# Patient Record
Sex: FEMALE | Race: WHITE
[De-identification: names, ages, dates, MRNs, and addresses within clinical notes are randomized per-mention and may not be internally consistent; named-entity substitution may affect disease eponyms.]

---

## 2018-05-29 NOTE — XR
Abdomen

 

HISTORY: Pain

 

Frontal view of the abdomen submitted on 2 images

 

There is a levoscoliosis centered at L3. Lung bases are clear. No pneumoperitoneum or bowel obstructi
on.

 

IMPRESSION: Nonobstructive bowel gas pattern

## 2018-05-29 NOTE — ED
Abdominal Pain HPI





- General


Chief Complaint: Abdominal Pain


Stated Complaint: abd pain


Time Seen by Provider: 05/29/18 19:36


Source: patient, RN notes reviewed, old records reviewed


Mode of arrival: ambulatory


Limitations: no limitations





- History of Present Illness


Initial Comments: 





This patient's history and 38-year-old female presents the emergency Department 

chief complaint of lower abdominal pain which is dull in nature for the past 2 

days.  She states that it occurred originally she was on a boat yesterday and 

seemed to become acutely worse.  Patient states that she minor tenderness in 

the right lower abdomen.  She reports the pain seems to come and go.  Hours 

with food.  She did have normal bowel movements and urination.  Denies any 

fever or chills.  No history of ovarian cysts.





- Related Data


 Home Medications











 Medication  Instructions  Recorded  Confirmed


 


Levothyroxine Sodium [Synthroid] 50 mcg PO DAILY 05/29/18 05/29/18











 Allergies











Allergy/AdvReac Type Severity Reaction Status Date / Time


 


No Known Allergies Allergy   Verified 05/29/18 20:37














Review of Systems


ROS Statement: 


Those systems with pertinent positive or pertinent negative responses have been 

documented in the HPI.





ROS Other: All systems not noted in ROS Statement are negative.





Past Medical History


Past Medical History: Thyroid Disorder


History of Any Multi-Drug Resistant Organisms: None Reported


Past Surgical History: Tonsillectomy


Past Psychological History: No Psychological Hx Reported


Smoking Status: Never smoker


Past Alcohol Use History: Occasional


Past Drug Use History: None Reported





General Exam





- General Exam Comments


Initial Comments: 





38-year-old female.  Alert and oriented.  No acute distress.


Limitations: no limitations


General appearance: alert, in no apparent distress


Head exam: Present: atraumatic, normocephalic, normal inspection


Eye exam: Present: normal appearance, PERRL, EOMI.  Absent: scleral icterus, 

conjunctival injection, periorbital swelling


ENT exam: Present: normal exam, mucous membranes moist


Neck exam: Present: normal inspection.  Absent: tenderness, meningismus, 

lymphadenopathy


Respiratory exam: Present: normal lung sounds bilaterally.  Absent: respiratory 

distress, wheezes, rales, rhonchi, stridor


Cardiovascular Exam: Present: regular rate, normal rhythm, normal heart sounds.

  Absent: systolic murmur, diastolic murmur, rubs, gallop, clicks


GI/Abdominal exam: Present: soft, tenderness (Patient has some periumbilical 

right lower quadrant tenderness.), normal bowel sounds.  Absent: distended, 

guarding, rebound, rigid


Extremities exam: Present: normal inspection, full ROM, normal capillary 

refill.  Absent: tenderness, pedal edema, joint swelling, calf tenderness


Back exam: Present: normal inspection


Neurological exam: Present: alert, oriented X3, CN II-XII intact


Psychiatric exam: Present: normal affect, normal mood


Skin exam: Present: warm, dry, intact, normal color.  Absent: rash





Course


 Vital Signs











  05/29/18 05/29/18





  18:47 21:32


 


Temperature 98.2 F 98.3 F


 


Pulse Rate 60 54 L


 


Respiratory 20 16





Rate  


 


Blood Pressure 126/71 108/59


 


O2 Sat by Pulse 100 100





Oximetry  














Medical Decision Making





- Medical Decision Making





38-year-old female presents to the emergency department stay chief complaint of 

minor periumbilical and right lower quadrant abdominal pain.  Patient has had a 

dull achy pain.  No change with food.  No fevers.  No melena urination.  No 

vomiting or diarrhea.  Today she has bilateral tender over her umbilical 

region.  Her lab work was reviewed and normal.  Vital signs are stable.  KUB 

shows no objective gas pattern.  I informed Patient with her my entrance we 

could do a computed tomography scan.  The same Patient states she would like to 

wait and watch.  I discussed that she can follow-up with primary care provider.

  Symptoms worsen or return she would need to have a computed tomography scan.  

Patient understands treatment plan will comply.





- Lab Data


Result diagrams: 


 05/29/18 20:00





 05/29/18 20:00


 Lab Results











  05/29/18 05/29/18 05/29/18 Range/Units





  20:00 20:00 20:00 


 


WBC   6.5   (3.8-10.6)  k/uL


 


RBC   4.04   (3.80-5.40)  m/uL


 


Hgb   11.4   (11.4-16.0)  gm/dL


 


Hct   36.0   (34.0-46.0)  %


 


MCV   89.2   (80.0-100.0)  fL


 


MCH   28.2   (25.0-35.0)  pg


 


MCHC   31.6   (31.0-37.0)  g/dL


 


RDW   14.5   (11.5-15.5)  %


 


Plt Count   275   (150-450)  k/uL


 


Neutrophils %   59   %


 


Lymphocytes %   29   %


 


Monocytes %   6   %


 


Eosinophils %   3   %


 


Basophils %   1   %


 


Neutrophils #   3.8   (1.3-7.7)  k/uL


 


Lymphocytes #   1.9   (1.0-4.8)  k/uL


 


Monocytes #   0.4   (0-1.0)  k/uL


 


Eosinophils #   0.2   (0-0.7)  k/uL


 


Basophils #   0.0   (0-0.2)  k/uL


 


PT     (9.0-12.0)  sec


 


INR     (<1.2)  


 


APTT     (22.0-30.0)  sec


 


Sodium  138    (137-145)  mmol/L


 


Potassium  4.2    (3.5-5.1)  mmol/L


 


Chloride  99    ()  mmol/L


 


Carbon Dioxide  29    (22-30)  mmol/L


 


Anion Gap  10    mmol/L


 


BUN  19 H    (7-17)  mg/dL


 


Creatinine  0.81    (0.52-1.04)  mg/dL


 


Est GFR (CKD-EPI)AfAm  >90    (>60 ml/min/1.73 sqM)  


 


Est GFR (CKD-EPI)NonAf  >90    (>60 ml/min/1.73 sqM)  


 


Glucose  94    (74-99)  mg/dL


 


Plasma Lactic Acid Roberto    0.6 L  (0.7-2.0)  mmol/L


 


Calcium  9.7    (8.4-10.2)  mg/dL


 


Total Bilirubin  0.3    (0.2-1.3)  mg/dL


 


AST  25    (14-36)  U/L


 


ALT  32    (9-52)  U/L


 


Alkaline Phosphatase  56    ()  U/L


 


Total Protein  6.6    (6.3-8.2)  g/dL


 


Albumin  4.1    (3.5-5.0)  g/dL


 


Amylase  63    ()  U/L


 


Lipase  200    ()  U/L


 


Urine Color     


 


Urine Appearance     (Clear)  


 


Urine pH     (5.0-8.0)  


 


Ur Specific Gravity     (1.001-1.035)  


 


Urine Protein     (Negative)  


 


Urine Glucose (UA)     (Negative)  


 


Urine Ketones     (Negative)  


 


Urine Blood     (Negative)  


 


Urine Nitrite     (Negative)  


 


Urine Bilirubin     (Negative)  


 


Urine Urobilinogen     (<2.0)  mg/dL


 


Ur Leukocyte Esterase     (Negative)  


 


Urine RBC     (0-5)  /hpf


 


Urine WBC     (0-5)  /hpf


 


Ur Squamous Epith Cells     (0-4)  /hpf


 


Urine HCG, Qual     (Not Detectd)  














  05/29/18 05/29/18 05/29/18 Range/Units





  20:00 20:00 20:00 


 


WBC     (3.8-10.6)  k/uL


 


RBC     (3.80-5.40)  m/uL


 


Hgb     (11.4-16.0)  gm/dL


 


Hct     (34.0-46.0)  %


 


MCV     (80.0-100.0)  fL


 


MCH     (25.0-35.0)  pg


 


MCHC     (31.0-37.0)  g/dL


 


RDW     (11.5-15.5)  %


 


Plt Count     (150-450)  k/uL


 


Neutrophils %     %


 


Lymphocytes %     %


 


Monocytes %     %


 


Eosinophils %     %


 


Basophils %     %


 


Neutrophils #     (1.3-7.7)  k/uL


 


Lymphocytes #     (1.0-4.8)  k/uL


 


Monocytes #     (0-1.0)  k/uL


 


Eosinophils #     (0-0.7)  k/uL


 


Basophils #     (0-0.2)  k/uL


 


PT  10.0    (9.0-12.0)  sec


 


INR  1.0    (<1.2)  


 


APTT  23.5    (22.0-30.0)  sec


 


Sodium     (137-145)  mmol/L


 


Potassium     (3.5-5.1)  mmol/L


 


Chloride     ()  mmol/L


 


Carbon Dioxide     (22-30)  mmol/L


 


Anion Gap     mmol/L


 


BUN     (7-17)  mg/dL


 


Creatinine     (0.52-1.04)  mg/dL


 


Est GFR (CKD-EPI)AfAm     (>60 ml/min/1.73 sqM)  


 


Est GFR (CKD-EPI)NonAf     (>60 ml/min/1.73 sqM)  


 


Glucose     (74-99)  mg/dL


 


Plasma Lactic Acid Roberto     (0.7-2.0)  mmol/L


 


Calcium     (8.4-10.2)  mg/dL


 


Total Bilirubin     (0.2-1.3)  mg/dL


 


AST     (14-36)  U/L


 


ALT     (9-52)  U/L


 


Alkaline Phosphatase     ()  U/L


 


Total Protein     (6.3-8.2)  g/dL


 


Albumin     (3.5-5.0)  g/dL


 


Amylase     ()  U/L


 


Lipase     ()  U/L


 


Urine Color    Light Yellow  


 


Urine Appearance    Clear  (Clear)  


 


Urine pH    6.5  (5.0-8.0)  


 


Ur Specific Gravity    1.012  (1.001-1.035)  


 


Urine Protein    Negative  (Negative)  


 


Urine Glucose (UA)    Negative  (Negative)  


 


Urine Ketones    Negative  (Negative)  


 


Urine Blood    Trace H  (Negative)  


 


Urine Nitrite    Negative  (Negative)  


 


Urine Bilirubin    Negative  (Negative)  


 


Urine Urobilinogen    <2.0  (<2.0)  mg/dL


 


Ur Leukocyte Esterase    Negative  (Negative)  


 


Urine RBC    1  (0-5)  /hpf


 


Urine WBC    <1  (0-5)  /hpf


 


Ur Squamous Epith Cells    5 H  (0-4)  /hpf


 


Urine HCG, Qual   Not Detected   (Not Detectd)  














- Radiology Data


Radiology results: report reviewed


Nonobstructive bowel gas pattern noted.





Disposition


Clinical Impression: 


 Abdominal pain





Disposition: HOME SELF-CARE


Condition: Good


Instructions:  Abdominal Pain (ED)


Additional Instructions: 


Patient advised to follow-up with primary care provider.  If he has fever or 

any worsening pain please return to the emergency department we may need to 

complete a CAT scan.  Recommended clear liquid diet for the next 1-2 days.


Is patient prescribed a controlled substance at d/c from ED?: No


When asked, does pt state using other controlled substances?: No


If prescribed controlled substance>3 days was MAPS reviewed?: No


If opioid is for acute pain is fill amount 7 days or less?: No


If Rx opioid, was Start Talking consent form obtained?: No


Referrals: 


Lino Kern DO [Primary Care Provider] - 1-2 days


Time of Disposition: 21:53

## 2019-10-15 NOTE — P.OP
Date of Procedure: 10/15/19


Preoperative Diagnosis: 


Endometrial polyp


Minimal nausea


Postoperative Diagnosis: 


Same


Procedure(s) Performed: 


Diagnostic hysteroscopy, polypectomy, NovaSure endometrial ablation


Anesthesia: MAC


Surgeon: Nadira Gates


Estimated Blood Loss (ml): 5


IV fluids (ml): 400


Urine output (ml): 25


Pathology: other (Endometrial polyp)


Condition: stable


Disposition: PACU


Operative Findings: 


Intrauterine cavity with polyp emanating from the right lateral aspect.


Description of Procedure: 


After the patient and her family were met in the preoperative holding area and 

all questions were answered, she was taken to the operating room where 

anesthetic was administered without incident.  She was then positioned, prepped 

and draped in the dorsal lithotomy position.  Appropriate timeout was 

undertaken.  Exam under anesthetic was undertaken.  Bladder was drained for 

approximately 25 mL of clear urine.  Speculum was placed in the vagina and the 

cervix was grasped anteriorly with single-toothed tenaculum.  Paracervical block

with lidocaine plus epinephrine was placed.  The uterus was sounded to 9.5 cm.  

The cervix was then sequentially dilated with Hegar dilators to allow for 

passage of the diagnostic hysteroscope.  Hysteroscope was introduced on and a 

fluffy endometrium with a endometrial polyp emanating from the left mid fundal 

area was noted.  Hysteroscope was removed.  The cervix was further dilated to 

allow for the introduction of the stone polyp forceps.  Tissue consistent with 

endometrial polyp was easily removed.  The NovaSure device was then inserted 

through a cavity length of 5.5 cm, width of 4.7 cm.  Cavity assessment test was 

passed.  The device was enabled with a power of 142 W for a treatment cycle of 

97 seconds.  Following cessation of the treatment cycle the device was removed. 

Hysteroscope was reintroduced.  Complete desiccation of the endometrium was 

appreciated.  Hysteroscope was removed.  Cervix was observed and no active 

bleeding was noted.  Instruments removed from the vagina.  The patient was 

awoken from anesthetic and transported recovery area in stable condition.  All 

counts reported to me as correct by the operating room staff.

## 2021-02-23 ENCOUNTER — HOSPITAL ENCOUNTER (OUTPATIENT)
Dept: HOSPITAL 47 - RADMAMWWP | Age: 41
End: 2021-02-23
Attending: OBSTETRICS & GYNECOLOGY
Payer: COMMERCIAL

## 2021-02-23 DIAGNOSIS — Z12.31: Primary | ICD-10-CM

## 2021-02-23 PROCEDURE — 77063 BREAST TOMOSYNTHESIS BI: CPT

## 2021-02-23 PROCEDURE — 77067 SCR MAMMO BI INCL CAD: CPT

## 2021-02-23 NOTE — MM
Reason for exam: screening  (asymptomatic).

Baseline mammogram.



History:

Took hormonal contraceptives for 20 years beginning at age 16.



Physical Findings:

Nurse did not find any significant physical abnormalities on exam.



MG 3D Screening Mammo W/Cad

Bilateral CC and MLO view(s) were taken.

The breast tissue is heterogeneously dense. This may lower the sensitivity of 

mammography.  There is no discrete abnormality.



These results were verbally communicated with the patient and result sheet given 

to the patient on 2/23/21.





ASSESSMENT: Negative, BI-RAD 1



RECOMMENDATION:

Routine screening mammogram of both breasts in 1 year.

## 2021-04-19 ENCOUNTER — HOSPITAL ENCOUNTER (OUTPATIENT)
Dept: HOSPITAL 47 - LABWHC1 | Age: 41
Discharge: HOME | End: 2021-04-19
Attending: OBSTETRICS & GYNECOLOGY
Payer: COMMERCIAL

## 2021-04-19 DIAGNOSIS — E03.9: Primary | ICD-10-CM

## 2021-04-19 DIAGNOSIS — E06.3: ICD-10-CM

## 2021-04-19 PROCEDURE — 84443 ASSAY THYROID STIM HORMONE: CPT

## 2021-04-19 PROCEDURE — 36415 COLL VENOUS BLD VENIPUNCTURE: CPT

## 2021-04-19 PROCEDURE — 84439 ASSAY OF FREE THYROXINE: CPT

## 2021-04-19 PROCEDURE — 84481 FREE ASSAY (FT-3): CPT

## 2021-04-20 LAB — T4 FREE SERPL-MCNC: 1.4 NG/DL (ref 0.8–1.8)

## 2022-04-26 ENCOUNTER — HOSPITAL ENCOUNTER (OUTPATIENT)
Dept: HOSPITAL 47 - LABWHC1 | Age: 42
Discharge: HOME | End: 2022-04-26
Attending: OBSTETRICS & GYNECOLOGY
Payer: COMMERCIAL

## 2022-04-26 DIAGNOSIS — N95.2: ICD-10-CM

## 2022-04-26 DIAGNOSIS — R53.83: ICD-10-CM

## 2022-04-26 DIAGNOSIS — E03.9: Primary | ICD-10-CM

## 2022-04-26 LAB
ALBUMIN SERPL-MCNC: 4.5 G/DL (ref 3.8–4.9)
ALBUMIN/GLOB SERPL: 1.64 G/DL (ref 1.6–3.17)
ALP SERPL-CCNC: 59 U/L (ref 41–126)
ALT SERPL-CCNC: 19 U/L (ref 8–44)
ANION GAP SERPL CALC-SCNC: 17.9 MMOL/L (ref 10–18)
AST SERPL-CCNC: 21 U/L (ref 13–35)
BUN SERPL-SCNC: 21.5 MG/DL (ref 9–27)
BUN/CREAT SERPL: 22.54 RATIO (ref 12–20)
CALCIUM SPEC-MCNC: 9.6 MG/DL (ref 8.7–10.3)
CHLORIDE SERPL-SCNC: 102 MMOL/L (ref 96–109)
CHOLEST SERPL-MCNC: 213 MG/DL (ref 0–200)
CO2 SERPL-SCNC: 20.8 MMOL/L (ref 20–27.5)
ERYTHROCYTE [DISTWIDTH] IN BLOOD BY AUTOMATED COUNT: 4.21 X 10*6/UL (ref 4.1–5.2)
ERYTHROCYTE [DISTWIDTH] IN BLOOD: 13.6 % (ref 11.5–14.5)
FSH SERPL-ACNC: 54.3 MIU/ML
GLOBULIN SER CALC-MCNC: 2.8 G/DL (ref 1.6–3.3)
GLUCOSE SERPL-MCNC: 91 MG/DL (ref 70–110)
HCT VFR BLD AUTO: 41.8 % (ref 37.2–46.3)
HDLC SERPL-MCNC: 80.3 MG/DL (ref 40–60)
HGB BLD-MCNC: 13.1 G/DL (ref 12–15)
LDLC SERPL CALC-MCNC: 114.7 MG/DL (ref 0–131)
MCH RBC QN AUTO: 31.1 PG (ref 27–32)
MCHC RBC AUTO-ENTMCNC: 31.3 G/DL (ref 32–37)
MCV RBC AUTO: 99.3 FL (ref 80–97)
NRBC BLD AUTO-RTO: 0 /100 WBCS (ref 0–0)
PLATELET # BLD AUTO: 251 X 10*3/UL (ref 140–440)
POTASSIUM SERPL-SCNC: 4.7 MMOL/L (ref 3.5–5.5)
PROT SERPL-MCNC: 7.3 G/DL (ref 6.2–8.2)
SODIUM SERPL-SCNC: 140 MMOL/L (ref 135–145)
TRIGL SERPL-MCNC: 89.8 MG/DL (ref 0–149)
VIT B12 SERPL-MCNC: 648 PG/ML (ref 200–944)
VLDLC SERPL CALC-MCNC: 17.96 MG/DL (ref 5–40)
WBC # BLD AUTO: 4.45 X 10*3/UL (ref 4.5–10)

## 2022-04-26 PROCEDURE — 85027 COMPLETE CBC AUTOMATED: CPT

## 2022-04-26 PROCEDURE — 84443 ASSAY THYROID STIM HORMONE: CPT

## 2022-04-26 PROCEDURE — 82670 ASSAY OF TOTAL ESTRADIOL: CPT

## 2022-04-26 PROCEDURE — 82607 VITAMIN B-12: CPT

## 2022-04-26 PROCEDURE — 83001 ASSAY OF GONADOTROPIN (FSH): CPT

## 2022-04-26 PROCEDURE — 82306 VITAMIN D 25 HYDROXY: CPT

## 2022-04-26 PROCEDURE — 80053 COMPREHEN METABOLIC PANEL: CPT

## 2022-04-26 PROCEDURE — 84481 FREE ASSAY (FT-3): CPT

## 2022-04-26 PROCEDURE — 84144 ASSAY OF PROGESTERONE: CPT

## 2022-04-26 PROCEDURE — 86376 MICROSOMAL ANTIBODY EACH: CPT

## 2022-04-26 PROCEDURE — 80061 LIPID PANEL: CPT

## 2022-04-26 PROCEDURE — 36415 COLL VENOUS BLD VENIPUNCTURE: CPT

## 2022-04-26 PROCEDURE — 84403 ASSAY OF TOTAL TESTOSTERONE: CPT

## 2022-12-08 ENCOUNTER — HOSPITAL ENCOUNTER (OUTPATIENT)
Dept: HOSPITAL 47 - RADMAMWWP | Age: 42
Discharge: HOME | End: 2022-12-08
Attending: OBSTETRICS & GYNECOLOGY
Payer: COMMERCIAL

## 2022-12-08 DIAGNOSIS — Z12.31: Primary | ICD-10-CM

## 2022-12-08 PROCEDURE — 77067 SCR MAMMO BI INCL CAD: CPT

## 2022-12-08 PROCEDURE — 77063 BREAST TOMOSYNTHESIS BI: CPT

## 2022-12-09 NOTE — MM
Reason for Exam: Screening  (asymptomatic). 

Last mammogram was performed 1 year(s) and 10 month(s) ago. 





Patient History: 

Menarche at age 15. First Full-Term Pregnancy at age 24. Hormonal Contraceptives for 20 years from

age 16 until age 36. 





Risk Values: 

Jody 5 year model risk: 0.7%.

NCI Lifetime model risk: 9.9%.





Prior Study Comparison: 

2/23/2021 Bilateral Screening Mammogram, [facility]. 





Tissue Density: 

The breast tissue is heterogeneously dense. This may lower the sensitivity of mammography.





Findings: 

Analyzed By CAD. 

There is no suspicious group of microcalcifications or new suspicious mass in either breast. 





Overall Assessment: Negative, BI-RAD 1





Management: 

Screening Mammogram of both breasts in 1 year.

A clinical breast exam by your physician is recommended on an annual basis and results should be

correlated with mammographic findings.



Electronically signed and approved by: Maynor Chilel M.D.

## 2024-01-12 ENCOUNTER — HOSPITAL ENCOUNTER (OUTPATIENT)
Dept: HOSPITAL 47 - RADMAMWWP | Age: 44
Discharge: HOME | End: 2024-01-12
Attending: OBSTETRICS & GYNECOLOGY
Payer: COMMERCIAL

## 2024-01-12 DIAGNOSIS — Z12.31: Primary | ICD-10-CM

## 2024-01-12 PROCEDURE — 77063 BREAST TOMOSYNTHESIS BI: CPT

## 2024-01-12 PROCEDURE — 77067 SCR MAMMO BI INCL CAD: CPT

## 2024-01-14 NOTE — MM
Reason for Exam: Screening  (asymptomatic). 

Last mammogram was performed 1 year(s) and 1 month(s) ago. 





Patient History: 

Menarche at age 15. First Full-Term Pregnancy at age 24. Perimenopausal. Currently using Estrogen,

starting at age 43. Hormonal Contraceptives for 20 years from age 16 until age 36. 





Risk Values: 

Jody 5 year model risk: 0.8%.

NCI Lifetime model risk: 9.7%.





Prior Study Comparison: 

2/23/2021 Bilateral Screening Mammogram, [facility]. 12/8/2022 Bilateral MG 3D screening mammo w/cad, [facility]. 





Tissue Density: 

There are scattered fibroglandular densities.





Findings: 

Analyzed By CAD. 

The pattern is symmetrical.

Pattern is stable

No suspicious groups of microcalcifications, spiculated or lobular masses, architectural distortion

or other secondary signs of malignancy are mammographically apparent. 





Overall Assessment: Benign, BI-RAD 2





Management: 

Screening Mammogram of both breasts in 1 year.

A negative mammogram report should not preclude additional follow up of suspicious palpable

abnormalities.

Patient should continue monthly self breast exam.

A clinical breast exam by your physician is recommended on an annual basis and results should be

correlated with mammographic findings.



Electronically signed and approved by: Yakov Nogueira D.O. Radiologis